# Patient Record
Sex: FEMALE | URBAN - METROPOLITAN AREA
[De-identification: names, ages, dates, MRNs, and addresses within clinical notes are randomized per-mention and may not be internally consistent; named-entity substitution may affect disease eponyms.]

---

## 2023-11-06 ENCOUNTER — APPOINTMENT (RX ONLY)
Dept: URBAN - METROPOLITAN AREA CLINIC 304 | Facility: CLINIC | Age: 48
Setting detail: DERMATOLOGY
End: 2023-11-06

## 2023-11-06 DIAGNOSIS — Z41.9 ENCOUNTER FOR PROCEDURE FOR PURPOSES OTHER THAN REMEDYING HEALTH STATE, UNSPECIFIED: ICD-10-CM

## 2023-11-06 PROCEDURE — ? OTHER

## 2023-11-06 NOTE — PROCEDURE: OTHER
Detail Level: Simple
Other (Free Text): Pt numbed with topical numbing for 45-60min. Sofwave for face and neck. Tolerated procedure well with mild warmth and 4-5/10 discomfort during procedure. We also discussed her doing Obagi hydrate luxe for moisturizer and ZO AOX qam
Note Text (......Xxx Chief Complaint.): This diagnosis correlates with the
Render Risk Assessment In Note?: no

## 2023-11-27 ENCOUNTER — RX ONLY (OUTPATIENT)
Age: 48
Setting detail: RX ONLY
End: 2023-11-27

## 2023-11-27 RX ORDER — CIPROFLOXACIN HYDROCHLORIDE 500 MG/1
TABLET, FILM COATED ORAL
Qty: 6 | Refills: 0 | Status: ERX | COMMUNITY
Start: 2023-11-27